# Patient Record
Sex: MALE | Race: WHITE | NOT HISPANIC OR LATINO | URBAN - NONMETROPOLITAN AREA
[De-identification: names, ages, dates, MRNs, and addresses within clinical notes are randomized per-mention and may not be internally consistent; named-entity substitution may affect disease eponyms.]

---

## 2019-02-15 ENCOUNTER — OFFICE VISIT CONVERTED (OUTPATIENT)
Dept: FAMILY MEDICINE CLINIC | Age: 24
End: 2019-02-15
Attending: NURSE PRACTITIONER

## 2019-03-13 ENCOUNTER — OFFICE VISIT CONVERTED (OUTPATIENT)
Dept: FAMILY MEDICINE CLINIC | Age: 24
End: 2019-03-13
Attending: NURSE PRACTITIONER

## 2019-07-10 ENCOUNTER — OFFICE VISIT CONVERTED (OUTPATIENT)
Dept: FAMILY MEDICINE CLINIC | Age: 24
End: 2019-07-10
Attending: NURSE PRACTITIONER

## 2021-04-02 ENCOUNTER — HOSPITAL ENCOUNTER (OUTPATIENT)
Dept: VACCINE CLINIC | Facility: HOSPITAL | Age: 26
Discharge: HOME | End: 2021-04-02
Attending: INTERNAL MEDICINE

## 2021-04-26 ENCOUNTER — HOSPITAL ENCOUNTER (OUTPATIENT)
Dept: VACCINE CLINIC | Facility: HOSPITAL | Age: 26
Discharge: HOME | End: 2021-04-26
Attending: INTERNAL MEDICINE

## 2021-05-18 NOTE — PROGRESS NOTES
Raquel Darrell HEIDI 1995     Office/Outpatient Visit    Visit Date: Wed, Jul 10, 2019 12:54 pm    Provider: Crystal Jacques N.P. (Assistant: Domi Charles MA)    Location: [facility]        Electronically signed by Crystal Jacques N.P. on  07/10/2019 01:16:45 PM                             SUBJECTIVE:        CC:     Juice is a 24 year old White male.  Patient presents today for follow up on medication         HPI: Juice is here today to follow up on depression. Presented five months ago with anhedonia, crying spells, fatigue, and mood changes. Started on Citalopram at that time. Reports that he has noticed improvement. Easier for him to get out of bed and has noticed decreased mood swings. Has decreased caffeine intake. Denies SI at present. Previously drinking about 12 beers per day. Stopped drinking on his own. No alcohol since 10/8/18. Has not attended counseling or AA. He did miss about three days of his medication and could really tell a difference.         ROS:     CONSTITUTIONAL:  Negative for chills and fever.      CARDIOVASCULAR:  Negative for chest pain and palpitations.      RESPIRATORY:  Negative for dyspnea and frequent wheezing.      NEUROLOGICAL:  Negative for dizziness and headaches.      PSYCHIATRIC:  Positive for depression ( (stable) ).   Negative for suicidal thoughts.          PMH/FMH/SH:     Last Reviewed on 7/10/2019 01:04 PM by Crystal Jacques    Past Medical History:             PAST MEDICAL HISTORY     UNREMARKABLE         Surgical History:     NONE         Family History:     Father: Depression     Mother: Hip replacement;  Depression         Social History:     Occupation: [employer]      Marital Status: Single     Children: None         Tobacco/Alcohol/Supplements:     Last Reviewed on 7/10/2019 12:56 PM by Domi Charles    Tobacco: Current Smoker: He currently smokes every day, 1/2 pack per day.          Alcohol: Patient has a past history of alcoholism.  His  last drink was 10/8/2018.  Previously was drinking about 12 beers per night.         Substance Abuse History:     None         Mental Health History:     NEGATIVE         Communicable Diseases (eg STDs):     Reportable health conditions; NEGATIVE             Current Problems:     Depression     Alcohol abuse, in remission     Screening for depression         Immunizations:     DTP  (Diphtheria-Tetanus-whole cell Pertussis) 1996     DTP  (Diphtheria-Tetanus-whole cell Pertussis) 1996     DTP  (Diphtheria-Tetanus-whole cell Pertussis) 1/7/1997     DTP  (Diphtheria-Tetanus-whole cell Pertussis) 9/22/1997     HIB (Haemophilus Influenza Type B) 9/22/1997     HIB (Haemophilus Influenza Type B) 1/7/1997     HIB (Haemophilus Influenza Type B) 1996     HIB (Haemophilus Influenza Type B) 1996     Hep B, unspecified formulation 4/29/1997     Hep B, unspecified formulation 1996     Hep B, unspecified formulation 1996     Hep A, adult dose 1/26/2019     MMR  (Measles-Mumps-Rubella), live 9/20/2000     MMR  (Measles-Mumps-Rubella), live 9/22/1999     Varicella, live 12/27/1999     Varivax (Varicella) 9/5/2007     Fluzone Quadrivalent (3+ years) 2/15/2019     Tdap (Tetanus, reduced diph, acellular pertussis) 1/26/2019     Tdap (Tetanus, reduced diph, acellular pertussis) 9/5/2007         Allergies:     Last Reviewed on 7/10/2019 12:55 PM by Domi Charles      No Known Drug Allergies.         Current Medications:     Last Reviewed on 7/10/2019 12:55 PM by Domi Charles    Citalopram Hydrobromide 10mg Tablet 1 tab daily         OBJECTIVE:        Vitals:         Current: 7/10/2019 12:57:19 PM    Ht:  6 ft, 2 in;  Wt: 232.2 lbs;  BMI: 29.8    T: 98.2 F (oral);  BP: 127/84 mm Hg (left arm, sitting);  P: 93 bpm (left arm (BP Cuff), sitting)        Exams:     PHYSICAL EXAM:     GENERAL: well developed, well nourished;  no apparent distress;     RESPIRATORY: normal respiratory rate and pattern with no  distress; normal breath sounds with no rales, rhonchi, wheezes or rubs;     CARDIOVASCULAR: normal rate; rhythm is regular;     MUSCULOSKELETAL: normal gait;     NEUROLOGIC: mental status: alert and oriented x 3; GROSSLY INTACT     PSYCHIATRIC: appropriate affect and demeanor; normal psychomotor function; normal speech pattern; normal thought and perception;         ASSESSMENT           311   F34.1  Depression              DDx:     305.03   F10.10  Alcohol abuse, in remission              DDx:         ORDERS:         Meds Prescribed:       Refill of: Citalopram Hydrobromide 10mg Tablet 1 tab daily  #90 (Ninety) tablet(s) Refills: 1         Lab Orders:       APPTO  Appointment need  (In-House)                   PLAN:          Depression Seek ER care immediately for worsening symptoms such as suicidal ideation.         RECOMMENDATIONS given include: increase physical activity and avoid substance abuse.  MIPS Vaccines Flu and Pneumonia updated in Shot record     FOLLOW-UP: Schedule a follow-up visit in 6 months.            Prescriptions:       Refill of: Citalopram Hydrobromide 10mg Tablet 1 tab daily  #90 (Ninety) tablet(s) Refills: 1           Orders:       APPTO  Appointment need  (In-House)            Alcohol abuse, in remission Stable. Continue to encourage counseling, AA.             Patient Recommendations:        For  Depression:     Exercise or increase level of physical activity. Avoid the use of substances known to contribute to depression, such as alcohol.  Schedule a follow-up visit in 6 months.              CHARGE CAPTURE           **Please note: ICD descriptions below are intended for billing purposes only and may not represent clinical diagnoses**        Primary Diagnosis:         311 Depression            F34.1    Dysthymic disorder              Orders:          43579   Office/outpatient visit; established patient, level 3  (In-House)             APPTO   Appointment need  (In-House)           305.03  Alcohol abuse, in remission            F10.10    Alcohol abuse, uncomplicated

## 2021-05-18 NOTE — PROGRESS NOTES
Darrell García 1995     Office/Outpatient Visit    Visit Date: Fri, Feb 15, 2019 08:54 am    Provider: Crystal Jacques N.P. (Assistant: Domi Charles MA)    Location: [facility]        Electronically signed by Crystal Jacques N.P. on  02/15/2019 09:48:56 AM                             SUBJECTIVE:        CC:     Juice is a 23 year old male.  This is his first visit to the clinic.  Patient presents today for new patient establishment, also has complaints of depression         HPI:         PHQ-9 Depression Screening: Completed form scanned and in chart; Total Score 19 Alcohol Consumption Screening: Completed form scanned and in chart; Total Score 0         In regard to the depression, currently not on any antidepressants.  Current affective symptoms include anhedonia, crying spells, fatigue and mood changes.  Presently, Juice Suicidal thoughts have improved. Denies suicide attempt. No plan..  Previously drinking about 12 beers per day. Stopped drinking on his own. No alchohol since 10/8/18. Not currently attending AA.      ROS:     CONSTITUTIONAL:  Negative for chills and fever.      EYES:  Negative for blurred vision and eye drainage.      E/N/T:  Negative for ear pain and sore throat.      CARDIOVASCULAR:  Negative for chest pain and palpitations.      RESPIRATORY:  Negative for dyspnea and frequent wheezing.      GASTROINTESTINAL:  Negative for abdominal pain and vomiting.      INTEGUMENTARY:  Negative for rash.      NEUROLOGICAL:  Negative for dizziness and headaches.      HEMATOLOGIC/LYMPHATIC:  Negative for easy bruising and lymphadenopathy.      ENDOCRINE:  Negative for polydipsia and polyphagia.      PSYCHIATRIC:  Positive for depression and suicidal thoughts ( fleeting thoughts, no plan ).          PMH/FMH/SH:     Last Reviewed on 2/15/2019 09:15 AM by Crystal Jacques    Past Medical History:             PAST MEDICAL HISTORY     UNREMARKABLE         Surgical History:     NONE         Family  History:     Father: Depression     Mother: Hip replacement;  Depression         Social History:     Occupation: [employer]      Marital Status: Single     Children: None         Tobacco/Alcohol/Supplements:     Last Reviewed on 2/15/2019 09:00 AM by Domi Charles    Tobacco: Current Smoker: He currently smokes every day, 1/2 pack per day.          Alcohol: Patient has a past history of alcoholism.  His last drink was 10/8/2018.  Previously was drinking about 12 beers per night.         Substance Abuse History:     None         Mental Health History:     NEGATIVE         Communicable Diseases (eg STDs):     Reportable health conditions; NEGATIVE             Current Problems:     Depression     Alcohol abuse, in remission     Cigarette smoking     Screening for depression         Immunizations:     Fluzone Quadrivalent (3+ years) 2/15/2019         Allergies:     Last Reviewed on 2/15/2019 08:59 AM by Domi Charles      No Known Drug Allergies.         Current Medications:     Last Reviewed on 2/15/2019 08:59 AM by Domi Charles    None        OBJECTIVE:        Vitals:         Current: 2/15/2019 9:01:25 AM    Ht:  6 ft, 2 in;  Wt: 223.2 lbs;  BMI: 28.7    T: 97.5 F (oral);  BP: 126/66 mm Hg (left arm, sitting);  P: 88 bpm (left arm (BP Cuff), sitting)        Exams:     PHYSICAL EXAM:     GENERAL: well developed, well nourished;  no apparent distress;     RESPIRATORY: normal respiratory rate and pattern with no distress; normal breath sounds with no rales, rhonchi, wheezes or rubs;     CARDIOVASCULAR: normal rate; rhythm is regular;     MUSCULOSKELETAL: normal gait;     NEUROLOGIC: mental status: alert and oriented x 3; GROSSLY INTACT     PSYCHIATRIC: appropriate affect and demeanor; normal psychomotor function; normal speech pattern; normal thought and perception;         Procedures:     Influenza vaccination     1. Influenza, seasonal (children 3 years to adult): 0.5 ml unit dose given IM in the  right upper arm; administered by KG;  lot number [id number]; expires 06/30/2019 Regarding contraindications to an Influenza vaccine: Denies moderate/severe illness with/without fever; serious reaction to eggs, egg proteins, gentamicin, gelatin, arginine, neomycin or polymixin; serious reaction after recieving previous influenza vaccines; and history of Guillain-[place] Syndrome.              ASSESSMENT           311   F34.1  Depression              DDx:     V79.0   Z13.89  Screening for depression              DDx:     V04.81   Z23  Influenza vaccination              DDx:     305.1   F17.200  Cigarette smoking              DDx:     305.03   F10.10  Alcohol abuse, in remission              DDx:         ORDERS:         Meds Prescribed:       Citalopram Hydrobromide 10mg Tablet 1 tab daily  #30 (Thirty) tablet(s) Refills: 0         Lab Orders:       APPTO  Appointment need  (In-House)           Procedures Ordered:       44264  Immunization administration; one vaccine  (In-House)           Other Orders:       68565  Influenza virus vaccine, quadrivalent, split virus, preservative free 3 years of age & older  (In-House)         4004F  Pt scrnd tobacco use rcvd tobacco cessation talk  (In-House)           Depression screen negative  (In-House)           Negative EtOH screen  (In-House)                   PLAN:          Depression Will start Citalopram. Potential side effects discussed. Seek care immediately for worsening symptoms such as suicidal ideation.         RECOMMENDATIONS given include: increase physical activity and Counseling.      FOLLOW-UP: Schedule a follow-up visit in 1 month.            Prescriptions:       Citalopram Hydrobromide 10mg Tablet 1 tab daily  #30 (Thirty) tablet(s) Refills: 0           Orders:       APPTO  Appointment need  (In-House)             Patient Education Handouts:       M[facility] Health and Substance Abuse [facility]           Screening for depression     MIPS PHQ-9  Depression Screening Completed form scanned and in chart; Total Score 19 Negative alcohol screen           Orders:         Depression screen negative  (In-House)           Negative EtOH screen  (In-House)            Influenza vaccination         IMMUNIZATIONS given today: Influenza Quadrivalent psv free shot 3 & up.            Orders:       22507  Influenza virus vaccine, quadrivalent, split virus, preservative free 3 years of age & older  (In-House)         21202  Immunization administration; one vaccine  (In-House)            Cigarette smoking         RECOMMENDATIONS given include: Counseled on smoking cessation and advised of the benefits to patient's health if he were to stop smoking. Counseling for less than 3 minutes.            Orders:       4004F  Pt scrnd tobacco use rcvd tobacco cessation talk  (In-House)            Alcohol abuse, in remission         RECOMMENDATIONS given include: join AA.              Patient Recommendations:        For  Depression:     Exercise or increase level of physical activity.  Schedule a follow-up visit in 1 month.          For  Cigarette smoking:         Stop smoking.          For  Alcohol abuse, in remission:     Contact AA and begin attending their meetings.  Their web site is www.aa.org             CHARGE CAPTURE           **Please note: ICD descriptions below are intended for billing purposes only and may not represent clinical diagnoses**        Primary Diagnosis:         311 Depression            F34.1    Dysthymic disorder              Orders:          14263   Office visit - new pt, level 3  (In-House)             APPTO   Appointment need  (In-House)           V79.0 Screening for depression            Z13.89    Encounter for screening for other disorder              Orders:             Depression screen negative  (In-House)                Negative EtOH screen  (In-House)           V04.81 Influenza vaccination            Z23    Encounter for  immunization              Orders:          84144   Influenza virus vaccine, quadrivalent, split virus, preservative free 3 years of age & older  (In-House)             51071   Immunization administration; one vaccine  (In-House)           305.1 Cigarette smoking            F17.200    Nicotine dependence, unspecified, uncomplicated              Orders:          4004F   Pt scrnd tobacco use rcvd tobacco cessation talk  (In-House)           305.03 Alcohol abuse, in remission            F10.10    Alcohol abuse, uncomplicated

## 2021-05-18 NOTE — PROGRESS NOTES
Darrell García 1995     Office/Outpatient Visit    Visit Date: Wed, Mar 13, 2019 12:11 pm    Provider: Crystal Jacques N.P. (Assistant: Domi Charles MA)    Location: [facility]        Electronically signed by Crystal Jacques N.P. on  03/13/2019 12:28:49 PM                             SUBJECTIVE:        CC:     Juice is a 23 year old White male.  This is a follow-up visit.  Patient presents today for follow up on depression         HPI: Juice is here today to follow up on depression. Presented one month ago with anhedonia, crying spells, fatigue, and mood changes. Started on Citalopram one month ago. Reports that he has noticed improvement. Easier for him to get out of bed and has noticed decreased mood swings. Has decreased caffeine intake. Denies SI at present. Previously drinking about 12 beers per day. Stopped drinking on his own. No alcohol since 10/8/18. Has not attended counseling or AA.             ROS:     CONSTITUTIONAL:  Positive for fatigue ( improving ).   Negative for fever.      CARDIOVASCULAR:  Negative for chest pain and palpitations.      RESPIRATORY:  Negative for dyspnea and frequent wheezing.      NEUROLOGICAL:  Negative for dizziness and headaches.      PSYCHIATRIC:  Positive for depression ( (improved on Citalopram) ).   Negative for suicidal thoughts.          PMH/FMH/SH:     Last Reviewed on 2/15/2019 09:15 AM by Crystal Jacques    Past Medical History:             PAST MEDICAL HISTORY     UNREMARKABLE         Surgical History:     NONE         Family History:     Father: Depression     Mother: Hip replacement;  Depression         Social History:     Occupation: [employer]      Marital Status: Single     Children: None         Tobacco/Alcohol/Supplements:     Last Reviewed on 2/15/2019 09:00 AM by Domi Charles    Tobacco: Current Smoker: He currently smokes every day, 1/2 pack per day.          Alcohol: Patient has a past history of alcoholism.  His last drink  was 10/8/2018.  Previously was drinking about 12 beers per night.         Substance Abuse History:     None         Mental Health History:     NEGATIVE         Communicable Diseases (eg STDs):     Reportable health conditions; NEGATIVE             Current Problems:     Depression     Alcohol abuse, in remission     Cigarette smoking     Screening for depression         Immunizations:     DTP  (Diphtheria-Tetanus-whole cell Pertussis) 1996     DTP  (Diphtheria-Tetanus-whole cell Pertussis) 1996     DTP  (Diphtheria-Tetanus-whole cell Pertussis) 1/7/1997     DTP  (Diphtheria-Tetanus-whole cell Pertussis) 9/22/1997     HIB (Haemophilus Influenza Type B) 9/22/1997     HIB (Haemophilus Influenza Type B) 1/7/1997     HIB (Haemophilus Influenza Type B) 1996     HIB (Haemophilus Influenza Type B) 1996     Hep B, unspecified formulation 4/29/1997     Hep B, unspecified formulation 1996     Hep B, unspecified formulation 1996     Hep A, adult dose 1/26/2019     MMR  (Measles-Mumps-Rubella), live 9/20/2000     MMR  (Measles-Mumps-Rubella), live 9/22/1999     Varicella, live 12/27/1999     Varivax (Varicella) 9/5/2007     Fluzone Quadrivalent (3+ years) 2/15/2019     Tdap (Tetanus, reduced diph, acellular pertussis) 1/26/2019     Tdap (Tetanus, reduced diph, acellular pertussis) 9/5/2007         Allergies:     Last Reviewed on 2/15/2019 08:59 AM by Domi Charles      No Known Drug Allergies.         Current Medications:     Last Reviewed on 2/15/2019 08:59 AM by Domi Charles    Citalopram Hydrobromide 10mg Tablet 1 tab daily         OBJECTIVE:        Vitals:         Historical:     02/15/2019  Wt:   223.2lbs        Current: 3/13/2019 12:14:42 PM    Ht:  6 ft, 2 in;  Wt: 226 lbs;  BMI: 29.0    T: 97.4 F (oral);  BP: 135/76 mm Hg (left arm, sitting);  P: 83 bpm (left arm (BP Cuff), sitting)        Exams:     PHYSICAL EXAM:     GENERAL: well developed, well nourished;  no apparent distress;      RESPIRATORY: normal respiratory rate and pattern with no distress; normal breath sounds with no rales, rhonchi, wheezes or rubs;     CARDIOVASCULAR: normal rate; rhythm is regular;     GASTROINTESTINAL: nontender; normal bowel sounds; no organomegaly;     MUSCULOSKELETAL: normal gait;     NEUROLOGIC: mental status: alert and oriented x 3; GROSSLY INTACT     PSYCHIATRIC: appropriate affect and demeanor; normal psychomotor function; normal speech pattern; normal thought and perception;         ASSESSMENT           311   F34.1  Depression              DDx:     305.03   F10.10  Alcohol abuse, in remission              DDx:         ORDERS:         Meds Prescribed:       Refill of: Citalopram Hydrobromide 10mg Tablet 1 tab daily  #90 (Ninety) tablet(s) Refills: 0         Lab Orders:       APPTO  Appointment need  (In-House)           Other Orders:         Calculated BMI above the upper parameter and a follow-up plan was documented in the medical record  (In-House)                   PLAN:          Depression Seek ER care immediately for worsening symptoms such as suicidal ideation. Continue to recommend counseling and attending AA. Has information from prior to call.     MIPS Vaccines Flu and Pneumonia updated in Shot record     BMI Elevated - Follow-Up Plan: He was provided education on weight loss strategies     FOLLOW-UP: Schedule a follow-up visit in 3 months. Patient will call to schedule follow-up appointment           Prescriptions:       Refill of: Citalopram Hydrobromide 10mg Tablet 1 tab daily  #90 (Ninety) tablet(s) Refills: 0           Orders:       APPTO  Appointment need  (In-House)           Calculated BMI above the upper parameter and a follow-up plan was documented in the medical record  (In-House)            Alcohol abuse, in remission As above.             Patient Recommendations:        For  Depression:     Schedule a follow-up visit in 3 months.              CHARGE CAPTURE           **Please  note: ICD descriptions below are intended for billing purposes only and may not represent clinical diagnoses**        Primary Diagnosis:         311 Depression            F34.1    Dysthymic disorder              Orders:          02826   Office/outpatient visit; established patient, level 3  (In-House)             APPTO   Appointment need  (In-House)                Calculated BMI above the upper parameter and a follow-up plan was documented in the medical record  (In-House)           305.03 Alcohol abuse, in remission            F10.10    Alcohol abuse, uncomplicated

## 2021-07-01 VITALS
SYSTOLIC BLOOD PRESSURE: 135 MMHG | WEIGHT: 226 LBS | DIASTOLIC BLOOD PRESSURE: 76 MMHG | BODY MASS INDEX: 29 KG/M2 | HEIGHT: 74 IN | TEMPERATURE: 97.4 F | HEART RATE: 83 BPM

## 2021-07-01 VITALS
WEIGHT: 232.2 LBS | BODY MASS INDEX: 29.8 KG/M2 | HEART RATE: 93 BPM | HEIGHT: 74 IN | SYSTOLIC BLOOD PRESSURE: 127 MMHG | DIASTOLIC BLOOD PRESSURE: 84 MMHG | TEMPERATURE: 98.2 F

## 2021-07-01 VITALS
BODY MASS INDEX: 28.64 KG/M2 | DIASTOLIC BLOOD PRESSURE: 66 MMHG | SYSTOLIC BLOOD PRESSURE: 126 MMHG | WEIGHT: 223.2 LBS | TEMPERATURE: 97.5 F | HEIGHT: 74 IN | HEART RATE: 88 BPM